# Patient Record
Sex: MALE | Race: WHITE | NOT HISPANIC OR LATINO | URBAN - METROPOLITAN AREA
[De-identification: names, ages, dates, MRNs, and addresses within clinical notes are randomized per-mention and may not be internally consistent; named-entity substitution may affect disease eponyms.]

---

## 2020-01-01 ENCOUNTER — OFFICE (OUTPATIENT)
Dept: URBAN - METROPOLITAN AREA CLINIC 19 | Facility: CLINIC | Age: 80
End: 2020-01-01
Payer: COMMERCIAL

## 2020-01-01 VITALS
HEIGHT: 70 IN | WEIGHT: 164 LBS | SYSTOLIC BLOOD PRESSURE: 124 MMHG | DIASTOLIC BLOOD PRESSURE: 62 MMHG | HEART RATE: 105 BPM | TEMPERATURE: 100 F

## 2020-01-01 DIAGNOSIS — D72.829 ELEVATED WHITE BLOOD CELL COUNT, UNSPECIFIED: ICD-10-CM

## 2020-01-01 DIAGNOSIS — K22.2 ESOPHAGEAL OBSTRUCTION: ICD-10-CM

## 2020-01-01 DIAGNOSIS — Z86.010 PERSONAL HISTORY OF COLONIC POLYPS: ICD-10-CM

## 2020-01-01 DIAGNOSIS — D50.9 IRON DEFICIENCY ANEMIA, UNSPECIFIED: ICD-10-CM

## 2020-01-01 DIAGNOSIS — Z80.0 FAMILY HISTORY OF MALIGNANT NEOPLASM OF DIGESTIVE ORGANS: ICD-10-CM

## 2020-01-01 DIAGNOSIS — K21.9 GASTRO-ESOPHAGEAL REFLUX DISEASE WITHOUT ESOPHAGITIS: ICD-10-CM

## 2020-01-01 DIAGNOSIS — R63.0 ANOREXIA: ICD-10-CM

## 2020-01-01 DIAGNOSIS — K59.00 CONSTIPATION, UNSPECIFIED: ICD-10-CM

## 2020-01-01 PROCEDURE — 99204 OFFICE O/P NEW MOD 45 MIN: CPT

## 2020-01-01 RX ORDER — SODIUM PICOSULFATE, MAGNESIUM OXIDE, AND ANHYDROUS CITRIC ACID 10; 3.5; 12 MG/160ML; G/160ML; G/160ML
LIQUID ORAL
Qty: 1 | Refills: 0 | Status: ACTIVE
Start: 2020-01-01

## 2020-03-26 NOTE — SERVICENOTES
The patient's assessment was reviewed extensively over the phone with Dr. Hanson and a collaborative plan of care was established.

## 2020-03-26 NOTE — SERVICEHPINOTES
80-year-old white male here with his wife for evaluation of anemia, unintentional weight loss and anorexia.  He has been seen by his primary care provider recently which is found steadily decreasing hematocrit of 30% on 3/12/20, 29% on 3/17/20, and 27% on 3/23/20. He also had leukocytosis with a WBC of 13.3-->15.2-->15.4. BMP on 3/27/20 was normal. He has had some mild shortness of breath and fatigue, but denies rectal bleeding, melena or hematochezia. Over last 4-5 months he has lost around 25-30 lb.  His appetite has also decreased.  He has chronic reflux that is managed with low-dose Prilosec 20 mg/d.  He denies dysphagia, abdominal pain or change in chronic constipation. He  takes milk of magnesia several times a week.He was last here for EGD/colonoscopy 12/10/14 which found mild gastritis, hiatal hernia, and Schatzki ring (dilated).  Colonoscopy was normal. Previous colonoscopy 8/16/07 with Jun Oh MD found and removed a small hyperplastic polyp and adenomatous polyp.  His mother had colon cancer at age 84.